# Patient Record
Sex: MALE | Race: WHITE | NOT HISPANIC OR LATINO | URBAN - METROPOLITAN AREA
[De-identification: names, ages, dates, MRNs, and addresses within clinical notes are randomized per-mention and may not be internally consistent; named-entity substitution may affect disease eponyms.]

---

## 2021-06-28 ENCOUNTER — EMERGENCY (EMERGENCY)
Facility: HOSPITAL | Age: 31
LOS: 1 days | Discharge: HOME | End: 2021-06-28
Attending: EMERGENCY MEDICINE | Admitting: EMERGENCY MEDICINE
Payer: COMMERCIAL

## 2021-06-28 VITALS
OXYGEN SATURATION: 100 % | RESPIRATION RATE: 18 BRPM | DIASTOLIC BLOOD PRESSURE: 90 MMHG | TEMPERATURE: 99 F | HEART RATE: 110 BPM | SYSTOLIC BLOOD PRESSURE: 140 MMHG

## 2021-06-28 LAB
ALBUMIN SERPL ELPH-MCNC: 4.8 G/DL — SIGNIFICANT CHANGE UP (ref 3.3–5)
ALP SERPL-CCNC: 55 U/L — SIGNIFICANT CHANGE UP (ref 40–120)
ALT FLD-CCNC: 22 U/L — SIGNIFICANT CHANGE UP (ref 4–41)
ANION GAP SERPL CALC-SCNC: 15 MMOL/L — HIGH (ref 7–14)
APAP SERPL-MCNC: <15 UG/ML — SIGNIFICANT CHANGE UP (ref 15–25)
AST SERPL-CCNC: 24 U/L — SIGNIFICANT CHANGE UP (ref 4–40)
BASOPHILS # BLD AUTO: 0.03 K/UL — SIGNIFICANT CHANGE UP (ref 0–0.2)
BASOPHILS NFR BLD AUTO: 0.4 % — SIGNIFICANT CHANGE UP (ref 0–2)
BILIRUB SERPL-MCNC: 0.6 MG/DL — SIGNIFICANT CHANGE UP (ref 0.2–1.2)
BUN SERPL-MCNC: 13 MG/DL — SIGNIFICANT CHANGE UP (ref 7–23)
CALCIUM SERPL-MCNC: 9.4 MG/DL — SIGNIFICANT CHANGE UP (ref 8.4–10.5)
CHLORIDE SERPL-SCNC: 103 MMOL/L — SIGNIFICANT CHANGE UP (ref 98–107)
CO2 SERPL-SCNC: 24 MMOL/L — SIGNIFICANT CHANGE UP (ref 22–31)
CREAT SERPL-MCNC: 1 MG/DL — SIGNIFICANT CHANGE UP (ref 0.5–1.3)
EOSINOPHIL # BLD AUTO: 0.18 K/UL — SIGNIFICANT CHANGE UP (ref 0–0.5)
EOSINOPHIL NFR BLD AUTO: 2.2 % — SIGNIFICANT CHANGE UP (ref 0–6)
ETHANOL SERPL-MCNC: <10 MG/DL — SIGNIFICANT CHANGE UP
GLUCOSE SERPL-MCNC: 96 MG/DL — SIGNIFICANT CHANGE UP (ref 70–99)
HCT VFR BLD CALC: 46.5 % — SIGNIFICANT CHANGE UP (ref 39–50)
HGB BLD-MCNC: 15.6 G/DL — SIGNIFICANT CHANGE UP (ref 13–17)
IANC: 6.13 K/UL — SIGNIFICANT CHANGE UP (ref 1.5–8.5)
IMM GRANULOCYTES NFR BLD AUTO: 0.2 % — SIGNIFICANT CHANGE UP (ref 0–1.5)
LYMPHOCYTES # BLD AUTO: 1.17 K/UL — SIGNIFICANT CHANGE UP (ref 1–3.3)
LYMPHOCYTES # BLD AUTO: 14.4 % — SIGNIFICANT CHANGE UP (ref 13–44)
MCHC RBC-ENTMCNC: 31 PG — SIGNIFICANT CHANGE UP (ref 27–34)
MCHC RBC-ENTMCNC: 33.5 GM/DL — SIGNIFICANT CHANGE UP (ref 32–36)
MCV RBC AUTO: 92.3 FL — SIGNIFICANT CHANGE UP (ref 80–100)
MONOCYTES # BLD AUTO: 0.58 K/UL — SIGNIFICANT CHANGE UP (ref 0–0.9)
MONOCYTES NFR BLD AUTO: 7.2 % — SIGNIFICANT CHANGE UP (ref 2–14)
NEUTROPHILS # BLD AUTO: 6.13 K/UL — SIGNIFICANT CHANGE UP (ref 1.8–7.4)
NEUTROPHILS NFR BLD AUTO: 75.6 % — SIGNIFICANT CHANGE UP (ref 43–77)
NRBC # BLD: 0 /100 WBCS — SIGNIFICANT CHANGE UP
NRBC # FLD: 0 K/UL — SIGNIFICANT CHANGE UP
PLATELET # BLD AUTO: 228 K/UL — SIGNIFICANT CHANGE UP (ref 150–400)
POTASSIUM SERPL-MCNC: 4.6 MMOL/L — SIGNIFICANT CHANGE UP (ref 3.5–5.3)
POTASSIUM SERPL-SCNC: 4.6 MMOL/L — SIGNIFICANT CHANGE UP (ref 3.5–5.3)
PROT SERPL-MCNC: 7 G/DL — SIGNIFICANT CHANGE UP (ref 6–8.3)
RBC # BLD: 5.04 M/UL — SIGNIFICANT CHANGE UP (ref 4.2–5.8)
RBC # FLD: 11.9 % — SIGNIFICANT CHANGE UP (ref 10.3–14.5)
SALICYLATES SERPL-MCNC: <5 MG/DL — LOW (ref 15–30)
SODIUM SERPL-SCNC: 142 MMOL/L — SIGNIFICANT CHANGE UP (ref 135–145)
TOXICOLOGY SCREEN, DRUGS OF ABUSE, SERUM RESULT: SIGNIFICANT CHANGE UP
WBC # BLD: 8.11 K/UL — SIGNIFICANT CHANGE UP (ref 3.8–10.5)
WBC # FLD AUTO: 8.11 K/UL — SIGNIFICANT CHANGE UP (ref 3.8–10.5)

## 2021-06-28 PROCEDURE — 99284 EMERGENCY DEPT VISIT MOD MDM: CPT | Mod: 25

## 2021-06-28 PROCEDURE — 90792 PSYCH DIAG EVAL W/MED SRVCS: CPT

## 2021-06-28 PROCEDURE — 12004 RPR S/N/AX/GEN/TRK7.6-12.5CM: CPT

## 2021-06-28 RX ORDER — CEPHALEXIN 500 MG
500 CAPSULE ORAL ONCE
Refills: 0 | Status: COMPLETED | OUTPATIENT
Start: 2021-06-28 | End: 2021-06-28

## 2021-06-28 RX ORDER — CEPHALEXIN 500 MG
1 CAPSULE ORAL
Qty: 20 | Refills: 0
Start: 2021-06-28 | End: 2021-07-07

## 2021-06-28 RX ORDER — TETANUS TOXOID, REDUCED DIPHTHERIA TOXOID AND ACELLULAR PERTUSSIS VACCINE, ADSORBED 5; 2.5; 8; 8; 2.5 [IU]/.5ML; [IU]/.5ML; UG/.5ML; UG/.5ML; UG/.5ML
0.5 SUSPENSION INTRAMUSCULAR ONCE
Refills: 0 | Status: COMPLETED | OUTPATIENT
Start: 2021-06-28 | End: 2021-06-28

## 2021-06-28 RX ADMIN — TETANUS TOXOID, REDUCED DIPHTHERIA TOXOID AND ACELLULAR PERTUSSIS VACCINE, ADSORBED 0.5 MILLILITER(S): 5; 2.5; 8; 8; 2.5 SUSPENSION INTRAMUSCULAR at 23:15

## 2021-06-28 RX ADMIN — Medication 500 MILLIGRAM(S): at 23:39

## 2021-06-28 NOTE — ED BEHAVIORAL HEALTH ASSESSMENT NOTE - DESCRIPTION
none calm, cooperative. no prns needed. given a tetanus shot for left arm laceration. Pt also given a dose of Keflex 500mg.   Vital Signs Last 24 Hrs  T(C): 37.2 (28 Jun 2021 19:17), Max: 37.2 (28 Jun 2021 19:17)  T(F): 98.9 (28 Jun 2021 19:17), Max: 98.9 (28 Jun 2021 19:17)  HR: 110 (28 Jun 2021 19:17) (110 - 110)  BP: 140/90 (28 Jun 2021 19:17) (140/90 - 140/90)  BP(mean): --  RR: 18 (28 Jun 2021 19:17) (18 - 18)  SpO2: 100% (28 Jun 2021 19:17) (100% - 100%) worked as a teacher, currently unemployed. lives with his parents

## 2021-06-28 NOTE — ED BEHAVIORAL HEALTH NOTE - BEHAVIORAL HEALTH NOTE
Writer contacted pt’s mother, Sarah, at [phone]. Mother and father, Srinivas, provided the following information:     Pt resides with parents. Pt is unemployed has been out of work for last 2 years with previous employment as and . Pt said to have left job due to anxiety related stress with master’s degree in early education. Pt has hx of ADD and anxiety throughout life. Parents reports it has been very difficult for him with ADD medication making pt more anxious. Parents reports pt also with alcohol abuse excessively drinking for last 5 years. First use in high school. Frequency not daily but current binge drinking (bottle of wine to 6 beers, etc.) and unable to control limits. No other substance use reported. Pt also said to be on 3 medications with unknown compliance. Parents question if pt is self-medicating. Current psychiatrist is Dr. Saira Lopez. Pt saw therapist for short period unaware of what happened. No current or past treatment for alcohol use. No past psychiatric hospitalizations.      Parents described pt to be embarrassed to go outside because he is humiliated that he has no job and has this fear of failure. Pt in turn has not really left the house in over a year. Pt spends majority of time in basement. Pt has not worked in 2 years.  Parents tried to let pt be for a period of time leaving job related concern alone for about a month. Father then however approached pt today reporting that this is the start of new week let’s try to get you job. Father reports pt with this “built in fear of failure” due to illness feels that he is going to fail in job. Father was trying to encourage him to find something to occupy him that he cannot stay in home for so long. Father suggests this to have most likely triggered pt. Parents were leaving for activity and last mentioned not wanting to see pt without a job this week and do just do anything. Parents then later returned home to find that pt had “drank himself into oblivion”. Mother reports that pt cut himself on arm. Mother says that he made cut with souvenir that he had from when he was a kid. Pt was bleeding when found. Mother reports pt was embarrassed did not want to show her the laceration and kept saying “I’m sorry, I’m sorry:” Pt was said to be under the influence. Parents then took pt to [facility] for evaluation giving pt choice for police to be called or coming with them. No SI intent of plan had been verbalized to parents. Pt with no hx of SIB or past attempts. No family hx of mental illness. No access to firearms. Pt with no hx of violence or aggression. No HI intent or plan. Pt’s sleep is terrible. Pt said to be eating constantly out of boredom. Pt gained 25+ pounds over year and half. Pt is showering and changing his clothes said to be very cleaning. No AH/VH reported. No paranoia. Mother reports main problem of anxiety and suffering from ADD. Mother often has to repeat self-multiple times to pt since childhood with poor focus. Parents feel pt needs help feeling worthless.

## 2021-06-28 NOTE — ED BEHAVIORAL HEALTH ASSESSMENT NOTE - NSSUICPROTFACT_PSY_ALL_CORE
Spiritism/Supportive social network of family or friends/Fear of death or the actual act of killing self/Engaged in work or school/Anglican beliefs

## 2021-06-28 NOTE — ED ADULT NURSE NOTE - OBJECTIVE STATEMENT
pt came in via walk-in fo c/o worsening depression, with passive SI, no plan. Denies ah,vh,hi,etoh,drugs. Pt reports a hx of mdd,adhd, and anxiety. Pt reports he is currently struggling to find a job and it has worsen his mental condition. Reports compliance with psych meds, but still feeling sad.

## 2021-06-28 NOTE — ED BEHAVIORAL HEALTH ASSESSMENT NOTE - MEDICATIONS (PRESCRIPTIONS, DIRECTIONS)
c/w home meds. Add Augment 500mg po bid for 9 days and return to ED in 3 days for a wound check. c/w home meds. Add Augmentin 875mg po bid for 10 days and return to ED in 3 days for a wound check.

## 2021-06-28 NOTE — ED PROVIDER NOTE - NS ED ROS FT
+ anxiety, + depression, + passive si + anxiety, + depression, + passive si  + wound to left forearm

## 2021-06-28 NOTE — ED PROVIDER NOTE - CLINICAL SUMMARY MEDICAL DECISION MAKING FREE TEXT BOX
This is a 30 yr old M, pmh anxiety, panic attack, learning disability with c/o depression and passive si. Pt arrived with mother Sarah ( [phone]).   Pt states he has a hard time to find a job, he identifies this as a stressor, and currently lives with his family and that does not help his situation. He sees his psychiatrist and takes his Lexapro and valium as prescribed. He stop seeing his therapist because he believed it did not help. Pt endorses he self medicating with alcohol. This is a 30 yr old M, pmh anxiety, panic attack, learning disability with c/o depression and passive si. Pt arrived with mother Sarah ( [phone]).   Pt states he has a hard time to find a job, he identifies this as a stressor, and currently lives with his family and that does not help his situation. He sees his psychiatrist and takes his Lexapro and valium as prescribed. He stop seeing his therapist because he believed it did not help. Pt endorses he self medicating with alcohol.    yesterday while intoxicated cut his left arm. Laceration is long with significant separation.  Will start abx, irrigate with copious saline, and perform loose suturing of middle most  section. Pt to f/u in ED for wound check in 3 days. This is a 30 yr old M, pmh anxiety, panic attack, learning disability with c/o depression and passive si. Pt arrived with mother Sarah ( [phone]).   Pt states he has a hard time to find a job, he identifies this as a stressor, and currently lives with his family and that does not help his situation. He sees his psychiatrist and takes his Lexapro and valium as prescribed. He stop seeing his therapist because he believed it did not help. Pt endorses he self medicating with alcohol.    yesterday while intoxicated cut his left arm. Laceration is long with significant separation.  Will start abx (augmentin), irrigate with copious saline, and perform loose suturing of middle most  section. Pt to f/u in ED for wound check in 3 days. Provide tdap

## 2021-06-28 NOTE — ED BEHAVIORAL HEALTH ASSESSMENT NOTE - HPI (INCLUDE ILLNESS QUALITY, SEVERITY, DURATION, TIMING, CONTEXT, MODIFYING FACTORS, ASSOCIATED SIGNS AND SYMPTOMS)
30yr old M, domiciled with his parents in [place], unemployed, non-caregiver, past psych hx of ADHD, Social Anxiety, alcohol use disorder, past med hx, no past psych hospitalizations, no past nssib, no past SA, was BIB parents for psych evaluation s/p parents finding a cut on his left forearm.     Patient was calm and cooperative yet tearful at times. Patient was having trouble organizing his thoughts and jumped from topic to topic but was able to be redirected. He states that he's frustrated with his current situation and states that he's been trying to find work for the last year but has been unable. He also reports he's been drinking more alcohol in the last year (at least 5 beers a night) but yesterday admits to drinking excessively which worried his parents. Initially he did not admit to any self injury but when family reported the cut to the  SW, patient admitted taking a fish hook souvenir and cutting his left forearm while intoxicated. He stated he did not know why he cut it, and adamantly denies any suicidal intent behind the cut. He states he has not been having suicidal thoughts throughout this time but has been having self deprecating thoughts about himself and feelings of low self worth because he has not been able to find a job. He states he just wants help with CBT and talk therapy to help him organize himself to find a job and that's why he came to the ED. Patient is however seeing an outpatient psychiatrist (Dr. Saira Lopez) who him and his family do not find helpful. He reports that he's on Adderall 10mg po bid but does not take it because he wakes up too late (reports he has no structure) and can't sleep if he takes it too late in the day.     Patient states he feels sad about his current work and home situation and thinks his mood and drinking is a product of his environment. He states that he exhausts himself and is fatigued from not doing anything and reports feeling unmotivated. He denies any changes to his appetite. States he has some middle insomnia (wakes up in the middle of the night worried about his unemployment).     He is denying any AH/VH and not exhibiting any signs of john at this time. Pt denies any other substance use.     Collateral from parents in  Note.

## 2021-06-28 NOTE — ED PROVIDER NOTE - OBJECTIVE STATEMENT
This is a 30 yr old M, pmh anxiety, panic attack, learning disability with c/o depression and passive si. Pt arrived with mother Sarah ( [phone]).   Pt states he has a hard time to find a job, he identifies this as a stressor. This is a 30 yr old M, pmh anxiety, panic attack, learning disability with c/o depression and passive si. Pt arrived with mother Sarah ( [phone]).   Pt states he has a hard time to find a job, he identifies this as a stressor, and currently lives with his family and that does not help his situation. He sees his psychiatrist and takes his Lexapro and valium as prescribed. He stop seeing his therapist because he believed it did not help. Pt endorses he self medicating with alcohol. This is a 30 yr old M, pmh anxiety, panic attack, learning disability with c/o depression and passive si. Pt arrived with mother Sarah ( [phone]).   Pt states he has a hard time to find a job, he identifies this as a stressor, and currently lives with his family and that does not help his situation. He sees his psychiatrist and takes his Lexapro and valium as prescribed. He stop seeing his therapist because he believed it did not help. Pt endorses he self medicating with alcohol.    Yesterday while drinking states he started "rolling" a souvenir (fishing related but never used for fishing - possibly a grappling hook?) along his left forearm and ended up cutting himself; reports he was unaware that he was cut, since he was intoxicated.  Occurred almost 24 hrs ago.

## 2021-06-28 NOTE — ED PROVIDER NOTE - PATIENT PORTAL LINK FT
You can access the FollowMyHealth Patient Portal offered by [facility] by registering at the following website: [email or web address]. By joining [facility]’s FollowMyHealth portal, you will also be able to view your health information using other applications (apps) compatible with our system.

## 2021-06-28 NOTE — ED BEHAVIORAL HEALTH ASSESSMENT NOTE - RISK ASSESSMENT
Moderate Acute Suicide Risk Pt is at a low to moderate risk of harm at this time given his alcohol use, impulsive behavior, untreated ADHD, financial and employment struggles and current dissatisfaction with treatment.   Protective factors include Lutheran, good social support, good access to health care, motivated for treatment, future oriented, wants to work and has no access to firearms. At this time patient does not meet criteria for involuntary psych hospitalization and is declining voluntary. He should f/u in outpatient substance treatment to continue mitigating risk factors.

## 2021-06-28 NOTE — ED PROVIDER NOTE - NSFOLLOWUPINSTRUCTIONS_ED_ALL_ED_FT
PLEASE RETURN TO ER WITHIN 3 DAYS FOR WOUND CHECK    TAKE  medication (ANTIBIOTIC AS PRESCRIBED) FOR INFECTION DUE TO LACERATION TO FOREARM.  FOLLOW-UP WITH YOUR PRIMARY CARE DOCTOR IN 1-2 DAYS TO DISCUSS YOUR ER APPOINTMENT. CALL OFFICE FOR APPOINTMENT.  YOU MAY FOLLOW-UP WITH THE [facility] AS NEEDED FOR .  IF YOU HAD LABS OR IMAGING DONE, YOU WERE GIVEN COPIES OF ALL LABS AND/OR IMAGING RESULTS FROM YOUR ER VISIT--PLEASE TAKE THEM WITH YOU TO YOUR FOLLOW UP APPOINTMENTS.  IF NEEDED, CALL PATIENT ACCESS SERVICES AT [phone] TO FIND A PRIMARY CARE PHYSICIAN.  OR CALL [phone] TO MAKE AN APPOINTMENT WITH THE CLINIC.   RETURN TO THE ER FOR ANY WORSENING SYMPTOMS OR CONCERNS.    Laceration    A laceration is a cut that goes through all of the layers of the skin and into the tissue that is right under the skin. Some lacerations heal on their own. Others need to be closed with skin adhesive strips, skin glue, stitches (sutures), or staples. Proper laceration care minimizes the risk of infection and helps the laceration to heal better.  If non-absorbable stitches or staples have been placed, they must be taken out within the time frame instructed by your healthcare provider.    SEEK IMMEDIATE MEDICAL CARE IF YOU HAVE ANY OF THE FOLLOWING SYMPTOMS: swelling around the wound, worsening pain, drainage from the wound, red streaking going away from your wound, inability to move finger or toe near the laceration, or discoloration of skin near the laceration.

## 2021-06-28 NOTE — ED BEHAVIORAL HEALTH ASSESSMENT NOTE - OTHER PAST PSYCHIATRIC HISTORY (INCLUDE DETAILS REGARDING ONSET, COURSE OF ILLNESS, INPATIENT/OUTPATIENT TREATMENT)
No past psych hospitalizations, no past SA  Currently in outpatient treatment with   Saira Lopez MD - Child & Adult Psychiatrist  [address] [place] [phone]

## 2021-06-28 NOTE — ED BEHAVIORAL HEALTH ASSESSMENT NOTE - DETAILS
no acute SI but discussed safety planning with family. Mother to dispense medications, Removal of all sharp objects/pills/otc meds. left laceration to flexor region of elbow Parents aware of plan none

## 2021-06-28 NOTE — ED ADULT TRIAGE NOTE - CHIEF COMPLAINT QUOTE
pt with hx of anxiety and depression, c/o low mood and fatigues. states he has thoughts of self harm. compliant with medications. pt is tearful in triage.

## 2021-06-28 NOTE — ED BEHAVIORAL HEALTH ASSESSMENT NOTE - SUMMARY
30yr old M, domiciled with his parents in [place], unemployed, non-caregiver, past psych hx of ADHD, Social Anxiety, alcohol use disorder, past med hx, no past psych hospitalizations, no past nssib, no past SA, was BIB parents for psych evaluation s/p parents finding a cut on his left forearm.     While patient here, he is tearful, anxious and reports feeling depressed about his current employment and housing situation. He admits to increased alcohol use with a recent impulsive self injurious gesture (cut self with a souvenir fish hook) that he reports is to help with his ADHD and anxiety. At this time patient is denying SI/I/P, not presenting as floridly psychotic/manic, exhibiting insight into his behaviors including alcohol use and is seeking treatment. Parents corroborate, and would like patient to seek treatment for substance and ADHD and anxiety as they are not currently satisfied with his current treatment provider.     Patient is declining voluntary hospitalization and does not meet criteria for involuntary psych hospitalization.  referral will be made to [facility].

## 2021-06-28 NOTE — ED PROVIDER NOTE - PROGRESS NOTE DETAILS
SHEILA Ford- with laceration to left forearm, he reports cutting it with a fishing tool last night while he was drunk, tetanus ordered, and abx will start.

## 2021-06-28 NOTE — ED PROVIDER NOTE - ATTENDING CONTRIBUTION TO CARE
ED Attending (Dr Serra): I have personally performed a face to face bedside history and physical examination of this patient.  I have discussed the case with the PA and  I have personally authored the following components: HPI, ROS, Physical Exam and MDM in addition to reviewing and revising the rest of the Provider Note. This is a 30 yr old M, pmh anxiety, panic attack, learning disability with c/o depression and passive si. Pt arrived with mother Sarah ( [phone]).   Pt states he has a hard time to find a job, he identifies this as a stressor, and currently lives with his family and that does not help his situation. He sees his psychiatrist and takes his Lexapro and valium as prescribed. He stop seeing his therapist because he believed it did not help. Pt endorses he self medicating with alcohol.    yesterday while intoxicated cut his left arm. Laceration is long with significant separation.  Will start abx, irrigate with copious saline, and perform loose suturing of middle most  section. Pt to f/u in ED for wound check in 3 days.

## 2021-06-29 VITALS
OXYGEN SATURATION: 96 % | DIASTOLIC BLOOD PRESSURE: 98 MMHG | HEART RATE: 75 BPM | SYSTOLIC BLOOD PRESSURE: 120 MMHG | TEMPERATURE: 99 F | RESPIRATION RATE: 17 BRPM

## 2021-06-29 DIAGNOSIS — F10.10 ALCOHOL ABUSE, UNCOMPLICATED: ICD-10-CM

## 2021-06-29 DIAGNOSIS — F90.9 ATTENTION-DEFICIT HYPERACTIVITY DISORDER, UNSPECIFIED TYPE: ICD-10-CM

## 2021-06-29 DIAGNOSIS — F40.10 SOCIAL PHOBIA, UNSPECIFIED: ICD-10-CM

## 2021-06-29 LAB — SARS-COV-2 RNA SPEC QL NAA+PROBE: SIGNIFICANT CHANGE UP

## 2021-06-29 NOTE — ED PROCEDURE NOTE - PROCEDURE ADDITIONAL DETAILS
loose approximation given wound is actively infected. Given extensiveness of wound decision was made by attg and myself to loosely approximate wound. pt started on Augmentin BID x 10 days. Pt to return in 3 days for wound check pt verbalizes agreement and understanding. VSS.

## 2021-06-29 NOTE — ED BEHAVIORAL HEALTH NOTE - BEHAVIORAL HEALTH NOTE
CELY met with pt to discuss  referral and process.  Pt is amenable to treatment on an outpatient basis, identified the [facility] as clinic of choice.  Pt with [facility] Plan insurance; best contact # is his cell # [phone], OK to leave a message.  Email address is [email or web address]  communication preference is via telephone at above noted number.  CELY provided pt with  information handout and explained  referral process to pt.  Pt info placed on the  referral log.

## 2021-06-29 NOTE — ED BEHAVIORAL HEALTH NOTE - BEHAVIORAL HEALTH NOTE
Log:   EMR indicates that pt. would like to follow up with the [facility] ([address], [place] [address] p. [phone] f. [phone]). CELY called and spoke with Doris, to initiate referral process. Requested information faxed.

## 2021-06-30 ENCOUNTER — OUTPATIENT (OUTPATIENT)
Dept: OUTPATIENT SERVICES | Facility: HOSPITAL | Age: 31
LOS: 1 days | Discharge: HOME | End: 2021-06-30

## 2021-06-30 DIAGNOSIS — F10.20 ALCOHOL DEPENDENCE, UNCOMPLICATED: ICD-10-CM

## 2021-06-30 PROBLEM — F41.9 ANXIETY DISORDER, UNSPECIFIED: Chronic | Status: ACTIVE | Noted: 2021-06-28

## 2021-06-30 PROBLEM — F41.0 PANIC DISORDER [EPISODIC PAROXYSMAL ANXIETY]: Chronic | Status: ACTIVE | Noted: 2021-06-28

## 2021-06-30 PROBLEM — F10.10 ALCOHOL ABUSE, UNCOMPLICATED: Chronic | Status: ACTIVE | Noted: 2021-06-28

## 2021-06-30 NOTE — ED BEHAVIORAL HEALTH NOTE - BEHAVIORAL HEALTH NOTE
Log:   CELY called [facility] ([address], [place] [address] p. [phone] f. [phone]) to check on status of referral. As per Nick, their office, spoke with pt. this morning and intake appointment is scheduled for today at 1:30pm. CELY then called pt. ([phone]) and left a message regarding above.

## 2021-07-01 LAB
COVID-19 SPIKE DOMAIN AB INTERP: POSITIVE
COVID-19 SPIKE DOMAIN ANTIBODY RESULT: 157 U/ML — HIGH
SARS-COV-2 IGG+IGM SERPL QL IA: 157 U/ML — HIGH
SARS-COV-2 IGG+IGM SERPL QL IA: POSITIVE

## 2023-05-10 NOTE — ED PROVIDER NOTE - NS ED MD DISPO DISCHARGE CCDA
Where Is Your Rash Located?: Bottom of feet
List Over The Counter Topical Steroids You Tried (Separate Each Name With A Comma):: Nallely
Additional Comments (Use Complete Sentences): Clindamycin was prescribed by urgent care, was not covered by insurance
Patient/Caregiver provided printed discharge information.